# Patient Record
Sex: MALE | Race: WHITE
[De-identification: names, ages, dates, MRNs, and addresses within clinical notes are randomized per-mention and may not be internally consistent; named-entity substitution may affect disease eponyms.]

---

## 2017-02-20 ENCOUNTER — HOSPITAL ENCOUNTER (INPATIENT)
Dept: HOSPITAL 74 - FM/S | Age: 57
LOS: 2 days | Discharge: HOME HEALTH | DRG: 301 | End: 2017-02-22
Attending: ORTHOPAEDIC SURGERY | Admitting: ORTHOPAEDIC SURGERY
Payer: COMMERCIAL

## 2017-02-20 VITALS — BODY MASS INDEX: 32.8 KG/M2

## 2017-02-20 DIAGNOSIS — M16.11: Primary | ICD-10-CM

## 2017-02-20 PROCEDURE — 8E0Y0CZ ROBOTIC ASSISTED PROCEDURE OF LOWER EXTREMITY, OPEN APPROACH: ICD-10-PCS | Performed by: ORTHOPAEDIC SURGERY

## 2017-02-20 PROCEDURE — 0SR90JA REPLACEMENT OF RIGHT HIP JOINT WITH SYNTHETIC SUBSTITUTE, UNCEMENTED, OPEN APPROACH: ICD-10-PCS | Performed by: ORTHOPAEDIC SURGERY

## 2017-02-20 RX ADMIN — DOCUSATE SODIUM,SENNOSIDES SCH: 50; 8.6 TABLET, FILM COATED ORAL at 23:21

## 2017-02-20 RX ADMIN — OXYCODONE HYDROCHLORIDE ONE: 10 TABLET, FILM COATED, EXTENDED RELEASE ORAL at 23:19

## 2017-02-20 RX ADMIN — CEFAZOLIN SODIUM SCH MLS/HR: 2 SOLUTION INTRAVENOUS at 23:28

## 2017-02-20 RX ADMIN — PANTOPRAZOLE SODIUM SCH: 40 TABLET, DELAYED RELEASE ORAL at 23:21

## 2017-02-20 RX ADMIN — GABAPENTIN SCH MG: 300 CAPSULE ORAL at 21:47

## 2017-02-20 RX ADMIN — OXYCODONE HYDROCHLORIDE AND ACETAMINOPHEN SCH MG: 500 TABLET ORAL at 21:47

## 2017-02-20 RX ADMIN — OXYCODONE HYDROCHLORIDE ONE MG: 10 TABLET, FILM COATED, EXTENDED RELEASE ORAL at 06:45

## 2017-02-20 RX ADMIN — GABAPENTIN ONE: 300 CAPSULE ORAL at 23:18

## 2017-02-20 RX ADMIN — ACETAMINOPHEN ONE: 10 INJECTION, SOLUTION INTRAVENOUS at 23:20

## 2017-02-20 RX ADMIN — OXYCODONE HYDROCHLORIDE SCH MG: 10 TABLET, FILM COATED, EXTENDED RELEASE ORAL at 21:47

## 2017-02-20 RX ADMIN — MULTIVITAMIN TABLET SCH: TABLET at 23:21

## 2017-02-20 RX ADMIN — DOCUSATE SODIUM,SENNOSIDES SCH TABLET: 50; 8.6 TABLET, FILM COATED ORAL at 21:47

## 2017-02-20 RX ADMIN — ACETAMINOPHEN ONE MG: 10 INJECTION, SOLUTION INTRAVENOUS at 12:30

## 2017-02-20 RX ADMIN — CEFAZOLIN SODIUM SCH MLS/HR: 2 SOLUTION INTRAVENOUS at 15:50

## 2017-02-20 RX ADMIN — OXYCODONE HYDROCHLORIDE AND ACETAMINOPHEN SCH: 500 TABLET ORAL at 23:21

## 2017-02-20 RX ADMIN — ACETAMINOPHEN SCH MG: 325 TABLET ORAL at 20:47

## 2017-02-20 RX ADMIN — GABAPENTIN ONE MG: 300 CAPSULE ORAL at 06:45

## 2017-02-20 NOTE — OP
Operative Note





- Note:


Operative Date: 02/20/17


Pre-Operative Diagnosis: right hip OA


Operation: right JOVANA with MAKOplasty


Post-Operative Diagnosis: Same as Pre-op


Surgeon: Srini Wilburn


Assistant: Berkley Lemons


Anesthesia: Spinal


Specimens Removed: resected bone


Estimated Blood Loss (mls): 200

## 2017-02-20 NOTE — SPEC
DATE OF OPERATION:

 

PREOPERATIVE DIAGNOSIS:  Right hip osteoarthritis.  

 

POSTOPERATIVE DIAGNOSIS:  Right hip osteoarthritis.  

 

PROCEDURE:  Right total hip replacement with Makoplasty robotic navigation.  

 

ATTENDING:  Nisa Toure M.D. 

 

ASSISTANT:  CRISTINE Winslow 

 

ANESTHESIA:  Spinal plus sedation.  

ESTIMATED BLOOD LOSS:  200 mL.  

 

COMPLICATIONS:  None.  

 

SPECIMENS:  Resected bone was sent for pathology and analysis.

 

DISPOSITION:  The patient was transferred to the PACU in stable condition.  

 

IMPLANTS USED:  [place] Accolade 2 size 5 femoral component, 52-mm acetabular

component, with MDM bipolar head ball, 35 mm acetabular screw, femoral neck angle was

127 degrees.  

 

INDICATION:  This is a 57-year-old male who presents to the office complaining of

severe right hip pain.  He was treated for many years with nonoperative management

and failed.  Oral medications, injections, physical therapy.  He was subsequently

indicated for a right total hip replacement with Makoplasty robotic navigation.  The

risks, benefits, and alternatives of the procedure are explained to the patient in

great detail, and he elected to proceed with the surgery. 

 

On the day of surgery, the patient was taken to the operating room and placed on the

OR table.  Spinal anesthesia was administered by the anesthesiologist.  The patient

was then positioned in the lateral decubitus position on the table and all bony

prominences were padded.  An axillary roll was placed.  The operative hip was then

prepped and draped in the usual sterile fashion and intravenous antibiotics were

given for infection prophylaxis.  A surgical time-out was then performed with the

team, and the patients identity, procedure, side, availability of implants, and the

administration of antibiotics was confirmed.  

 

An approximately 15cm longitudinal incision was made through the skin centered on the

greater trochanter of the hip.  This dissection was carried down through the

subcutaneous tissues to the deep fascia.  This fascia was then incised and a cobra

was placed around the inferior femoral neck.  Electrocautery was used to reflect the

anterior 40% of the gluteus medius and minimus starting at the musculotendinous

junction and leaving a cuff for closure.  This was reflected to reveal the capsule of

the hip joint.  An anterior capsulectomy was performed and the femoral head and neck

was visualized.  Grade 4 changes were noted diffusely throughout the joint.

 

At this point, three small stab incisions were made superior to the main incision

along the iliac crest.  Three self-drilling Willy pins were then placed and the

Jarred pelvic array was attached.   Reference points on the limb were then entered into

the robotic device and the limb length deficiency, offset, and femoral neck resection

level were then calculated by the software.  The hip was then dislocated with

traction and external rotation, an oscillating saw was used to make the femoral neck

cut at the level previously templated, and the femoral head was removed. 

 

Attention was then turned to the acetabulum.  Retractors were then placed around the

acetabulum and the labrum was removed.  An acetabular checkpoint pin and the [employer]

software was used to register the contours of the acetabulum.  The acetabulum was

then reamed in a single stage to the preoperatively templated size using the [employer]

robotic arm.  The appropriately sized cup was then impacted and had solid fixation as

well as the preset inclination and version of 40 and 20 degrees, respectively.  A

polyethylene liner was then placed in the cup.

 

Attention was then turned back to the femur, which was externally rotated for

improved visualization. A femoral neck elevator was used to present the femoral neck

cut, a box osteotome was used to enter the femoral canal, and a canal finder was used

to go down the femoral shaft.  The Jarred broaches were used sequentially until the

optimal scratch fit was achieved.  This correlated to the preoperatively templated

size.  From here, several different offset head and neck configurations were tested

until excellent stability and length was obtained.  These measurements were

quantified using the [employer] software.

 

All trial components were then removed, the femur was copiously irrigated, and the

final components were placed.  Leg length and stability were checked again and found

to be excellent.  Irrigation was performed again.  Wound closure was started by

repairing the abductor muscles with a No. 2 Fiberwire stitch in a [place]

configuration passed through bone tunnels in the greater trochanter and tied over a

bony bridge.  This repair was then reinforced with a 0 VLoc 180 barbed suture.  Next,

No. 1 Polysorb and 0 VLoc 180 was used to close the fascia.  The deep subcutaneous

tissue was closed with No. 1 Polysorb sutures, and 2-0 Polysorb was used for the

superficial subcutaneous tissue.  The skin was closed using both 3-0 VLoc 90 suture

in a running subcuticular fashion and SwiftSet skin adhesive.   The Jarred array and

pins were removed from the iliac crest and the stab incision sites were irrigated and

closed with 4-0 Polysorb sutures and SwiftSet skin adhesive.  Once this was completed

a sterile dressing was applied.  The patient was then awakened and taken to the PACU

in stable condition.

 

 

NISA TOURE M.D.

 

[id number]

DD: 02/20/2017 11:28

DT: 02/20/2017 17:33

Job #:  [id number]

## 2017-02-20 NOTE — HP
Admitting History and Physical





- Admission


Chief Complaint: right hip osteoarthritis x years


History of Present Illness: 


57 year old male presents today in regard to his right hip. Longstanding 

osteoarthritis of his right hip. Patient complains of pain, limited mobility 

and difficulty ambulating. Patient has failed conservative treatment including 

PO medication and activity modification. At this point, patient would like to 

proceed with a right total hip arthroplasty (MAKOplasty). 








- Past Surgical History


Additional Past Surgical History: 


See written H&P








- Smoking History


Smoking history: Former smoker


Have you smoked in the past 12 months: No


If you are a former smoker, when did you quit?: 1983





- Alcohol/Substance Use


Hx Alcohol Use: No





Home Medications





- Allergies


Allergies/Adverse Reactions: 


 Allergies











Allergy/AdvReac Type Severity Reaction Status Date / Time


 


aspirin Allergy Severe Difficulty Verified 02/20/17 06:50





   Breathing  


 


Iodine and Iodide Containing Allergy Severe Difficulty Verified 02/20/17 06:50





Produc   Breathing  


 


NSAIDS (Non-Steroidal Allergy Severe Difficulty Verified 02/20/17 06:50





Anti-Inflamma   Breathing  


 


shellfish derived Allergy Severe Difficulty Verified 02/20/17 06:50





   Breathing  














- Home Medications


Home Medications: 


Ambulatory Orders





Acetaminophen [Tylenol] 650 mg PO QID PRN 02/09/17 











Review of Systems





- Review of Systems


Musculoskeletal: reports: Decreased ROM (Right hip), Joint Pain





Physical Examination


Vital Signs: 


 Vital Signs











Temperature  97.9 F   02/20/17 06:52


 


Pulse Rate  60   02/20/17 06:52


 


Respiratory Rate  18   02/20/17 06:52


 


Blood Pressure  126/87   02/20/17 06:52


 


O2 Sat by Pulse Oximetry (%)  100   02/20/17 06:56











Constitutional: Yes: Well Nourished, No Distress


Eyes: Yes: Conjunctiva Clear


HENT: Yes: Atraumatic, Normocephalic


Neck: Yes: Supple


Cardiovascular: Yes: Regular Rate and Rhythm


Respiratory: Yes: Regular


Gastrointestinal: Yes: Soft


...Rectal Exam: Yes: Deferred


Musculoskeletal: Yes: Joint Stiffness, Other (Limited ROM right hip)





Assessment/Plan


57 year old male with longstanding right hip osteoarthritis. Patient has failed 

conservative treatment. Proceed with a right total hip arthroplasty (MAKOplasty)

.

## 2017-02-21 LAB
ANION GAP SERPL CALC-SCNC: 8 MMOL/L (ref 8–16)
CALCIUM SERPL-MCNC: 8.6 MG/DL (ref 8.4–10.2)
CO2 SERPL-SCNC: 28 MMOL/L (ref 22–28)
CREAT SERPL-MCNC: 0.9 MG/DL (ref 0.6–1.3)
DEPRECATED RDW RBC AUTO: 11.6 % (ref 11.9–15.9)
GLUCOSE SERPL-MCNC: 134 MG/DL (ref 74–106)
MCH RBC QN AUTO: 30.2 PG (ref 25.7–33.7)
MCHC RBC AUTO-ENTMCNC: 33.3 G/DL (ref 32–35.9)
MCV RBC: 90.6 FL (ref 80–96)
PLATELET # BLD AUTO: 215 K/MM3 (ref 134–434)
PMV BLD: 9.5 FL (ref 7.5–11.1)
WBC # BLD AUTO: 12.2 K/MM3 (ref 4–10)

## 2017-02-21 RX ADMIN — ACETAMINOPHEN SCH MG: 325 TABLET ORAL at 20:37

## 2017-02-21 RX ADMIN — GABAPENTIN SCH MG: 300 CAPSULE ORAL at 09:25

## 2017-02-21 RX ADMIN — APIXABAN SCH MG: 2.5 TABLET, FILM COATED ORAL at 09:30

## 2017-02-21 RX ADMIN — PANTOPRAZOLE SODIUM SCH MG: 40 TABLET, DELAYED RELEASE ORAL at 09:25

## 2017-02-21 RX ADMIN — DOCUSATE SODIUM,SENNOSIDES SCH TABLET: 50; 8.6 TABLET, FILM COATED ORAL at 09:25

## 2017-02-21 RX ADMIN — OXYCODONE HYDROCHLORIDE AND ACETAMINOPHEN SCH MG: 500 TABLET ORAL at 21:24

## 2017-02-21 RX ADMIN — ACETAMINOPHEN SCH: 325 TABLET ORAL at 02:00

## 2017-02-21 RX ADMIN — APIXABAN SCH MG: 2.5 TABLET, FILM COATED ORAL at 21:25

## 2017-02-21 RX ADMIN — ACETAMINOPHEN SCH MG: 325 TABLET ORAL at 08:34

## 2017-02-21 RX ADMIN — DOCUSATE SODIUM,SENNOSIDES SCH TABLET: 50; 8.6 TABLET, FILM COATED ORAL at 21:24

## 2017-02-21 RX ADMIN — OXYCODONE HYDROCHLORIDE AND ACETAMINOPHEN SCH MG: 500 TABLET ORAL at 09:25

## 2017-02-21 RX ADMIN — GABAPENTIN SCH MG: 300 CAPSULE ORAL at 21:24

## 2017-02-21 RX ADMIN — MULTIVITAMIN TABLET SCH TAB: TABLET at 09:25

## 2017-02-21 RX ADMIN — OXYCODONE HYDROCHLORIDE SCH MG: 10 TABLET, FILM COATED, EXTENDED RELEASE ORAL at 09:26

## 2017-02-21 RX ADMIN — OXYCODONE HYDROCHLORIDE SCH MG: 10 TABLET, FILM COATED, EXTENDED RELEASE ORAL at 21:24

## 2017-02-21 RX ADMIN — ACETAMINOPHEN SCH MG: 325 TABLET ORAL at 13:57

## 2017-02-21 NOTE — PN
Progress Note (short form)





- Note


Progress Note: 


57M POD1 s/p right THR under spinal anesthetic doing well. Pt states that pain 

is well controlled, AVSS, reports no anesthetic complications. Sensory and 

motor function intact in both lower extremities.

## 2017-02-22 VITALS — TEMPERATURE: 98.6 F

## 2017-02-22 VITALS — DIASTOLIC BLOOD PRESSURE: 70 MMHG | HEART RATE: 86 BPM | SYSTOLIC BLOOD PRESSURE: 115 MMHG

## 2017-02-22 LAB
ANION GAP SERPL CALC-SCNC: 8 MMOL/L (ref 8–16)
CALCIUM SERPL-MCNC: 8.4 MG/DL (ref 8.4–10.2)
CO2 SERPL-SCNC: 31 MMOL/L (ref 22–28)
CREAT SERPL-MCNC: 0.9 MG/DL (ref 0.6–1.3)
DEPRECATED RDW RBC AUTO: 11.9 % (ref 11.9–15.9)
GLUCOSE SERPL-MCNC: 121 MG/DL (ref 74–106)
MCH RBC QN AUTO: 30.2 PG (ref 25.7–33.7)
MCHC RBC AUTO-ENTMCNC: 32.9 G/DL (ref 32–35.9)
MCV RBC: 91.7 FL (ref 80–96)
PLATELET # BLD AUTO: 194 K/MM3 (ref 134–434)
PMV BLD: 9.4 FL (ref 7.5–11.1)
WBC # BLD AUTO: 9.6 K/MM3 (ref 4–10)

## 2017-02-22 RX ADMIN — MULTIVITAMIN TABLET SCH TAB: TABLET at 09:03

## 2017-02-22 RX ADMIN — PANTOPRAZOLE SODIUM SCH MG: 40 TABLET, DELAYED RELEASE ORAL at 09:03

## 2017-02-22 RX ADMIN — ACETAMINOPHEN SCH: 325 TABLET ORAL at 01:52

## 2017-02-22 RX ADMIN — DOCUSATE SODIUM,SENNOSIDES SCH TABLET: 50; 8.6 TABLET, FILM COATED ORAL at 09:03

## 2017-02-22 RX ADMIN — OXYCODONE HYDROCHLORIDE SCH MG: 10 TABLET, FILM COATED, EXTENDED RELEASE ORAL at 09:03

## 2017-02-22 RX ADMIN — GABAPENTIN SCH MG: 300 CAPSULE ORAL at 09:03

## 2017-02-22 RX ADMIN — ACETAMINOPHEN SCH MG: 325 TABLET ORAL at 13:18

## 2017-02-22 RX ADMIN — APIXABAN SCH MG: 2.5 TABLET, FILM COATED ORAL at 09:03

## 2017-02-22 RX ADMIN — ACETAMINOPHEN SCH MG: 325 TABLET ORAL at 08:04

## 2017-02-22 RX ADMIN — OXYCODONE HYDROCHLORIDE AND ACETAMINOPHEN SCH MG: 500 TABLET ORAL at 09:03

## 2017-02-22 NOTE — PN
Progress Note (short form)





- Note


Progress Note: 


This note is for Tuesday 2/21/17.





Pt seen and examined by me Tuesday evening.  Doing well.  Walked several 

hundred feet (possibly 1000+) with PT and with family throughout the day.  Pain 

well controlled. 





AVSS





 Laboratory Tests











  02/21/17 02/21/17





  07:33 07:33


 


WBC  12.2 H 


 


Hgb  13.1 


 


Hct  39.2 


 


Plt Count  215 


 


Sodium   135 L


 


Potassium   4.3


 


Chloride   99


 


Carbon Dioxide   28


 


Anion Gap   8


 


BUN   12


 


Creatinine   0.9


 


Random Glucose   134 H


 


Calcium   8.6











Gen: NAD





RLE:  c/d/i, NVID





A/P 58yo male POD#1 s/p R JOVANA





1.  PT/OOB





2.  D/C home tomorrow

## 2017-02-22 NOTE — PATH
Surgical Pathology Report



Patient Name:  YAMILET PÉREZ

Accession #:  

Med. Rec. #:  [id number]                                                        

   /Age/Gender:  1960 (Age: 57) / M

Account:  [id number]                                                          

             Location: [facility]

Taken:  2017

Received:  2017

Reported:  2017

Physicians:  Srini Wilburn M.D.

  



Specimen(s) Received

 RIGHT FEMORAL HEAD 





Clinical History

Right hip osteoarthritis







Final Diagnosis

FEMORAL HEAD, RIGHT, TOTAL HIP REPLACEMENT:

DEGENERATIVE JOINT DISEASE.





***Electronically Signed***

Marielena Vincent M.D.





Gross Description

Received in formalin, labeled "right femoral head," is a 4.8 x 4.8 x 4.5 cm.

femoral head with a 1.2 cm in length portion of femoral neck attached. The

margin of resection is smooth. There is a 3.3 cm in greatest dimension area of

eburnation present. The remaining articular surface is tan-yellow and focally

granular. The underlying trabecular bone is yellow and hard. A representative

section is submitted in one cassette, following decalcification.

## 2017-02-22 NOTE — PN
Progress Note (short form)





- Note


Progress Note: 


This note is for Wednesday 2/22/17.





Pt seen and examined by me this morning.  Doing well.  No complaints.





AVSS





 Selected Entries











  02/22/17





  05:23


 


Temperature 98.6 F


 


Pulse Rate 81


 


Respiratory 20





Rate 


 


Blood Pressure 125/84


 


O2 Sat by Pulse 99





Oximetry (%) 


 


Oxygen Delivery Room Air





Method 








 Laboratory Tests











  02/22/17 02/22/17





  07:00 07:00


 


WBC  9.6 


 


Hgb  12.8 


 


Hct  38.8 


 


Plt Count  194 


 


Sodium   Pending


 


Potassium   Pending











Gen: NAD





RLE:  c/d/i, NVID





A/P 56yo male POD#2 s/p R JOVANA





1.  PT/OOB





2.  D/C home today

## 2022-12-20 ENCOUNTER — OFFICE (OUTPATIENT)
Dept: URBAN - METROPOLITAN AREA CLINIC 86 | Facility: CLINIC | Age: 62
Setting detail: OPHTHALMOLOGY
End: 2022-12-20
Payer: MEDICARE

## 2022-12-20 DIAGNOSIS — H40.003: ICD-10-CM

## 2022-12-20 DIAGNOSIS — H33.311: ICD-10-CM

## 2022-12-20 DIAGNOSIS — H40.033: ICD-10-CM

## 2022-12-20 DIAGNOSIS — H25.13: ICD-10-CM

## 2022-12-20 DIAGNOSIS — E11.9: ICD-10-CM

## 2022-12-20 DIAGNOSIS — H11.042: ICD-10-CM

## 2022-12-20 PROCEDURE — 92250 FUNDUS PHOTOGRAPHY W/I&R: CPT | Performed by: OPHTHALMOLOGY

## 2022-12-20 PROCEDURE — 92020 GONIOSCOPY: CPT | Performed by: OPHTHALMOLOGY

## 2022-12-20 PROCEDURE — 92201 OPSCPY EXTND RTA DRAW UNI/BI: CPT | Performed by: OPHTHALMOLOGY

## 2022-12-20 PROCEDURE — 92014 COMPRE OPH EXAM EST PT 1/>: CPT | Performed by: OPHTHALMOLOGY

## 2022-12-20 ASSESSMENT — VISUAL ACUITY
OS_BCVA: 20/30-2
OD_BCVA: 20/25-1

## 2022-12-20 ASSESSMENT — CONFRONTATIONAL VISUAL FIELD TEST (CVF)
OD_FINDINGS: FULL
OS_FINDINGS: FULL

## 2022-12-20 ASSESSMENT — CORNEAL PTERYGIUM: OS_PTERYGIUM: NASAL 2MM

## 2022-12-20 ASSESSMENT — TONOMETRY
OS_IOP_MMHG: 18
OD_IOP_MMHG: 20

## 2022-12-28 ENCOUNTER — OFFICE (OUTPATIENT)
Dept: URBAN - METROPOLITAN AREA CLINIC 29 | Facility: CLINIC | Age: 62
Setting detail: OPHTHALMOLOGY
End: 2022-12-28
Payer: MEDICARE

## 2022-12-28 DIAGNOSIS — D31.31: ICD-10-CM

## 2022-12-28 DIAGNOSIS — H25.13: ICD-10-CM

## 2022-12-28 DIAGNOSIS — H40.003: ICD-10-CM

## 2022-12-28 DIAGNOSIS — H40.033: ICD-10-CM

## 2022-12-28 DIAGNOSIS — E11.9: ICD-10-CM

## 2022-12-28 DIAGNOSIS — H11.042: ICD-10-CM

## 2022-12-28 DIAGNOSIS — H43.811: ICD-10-CM

## 2022-12-28 PROCEDURE — 99213 OFFICE O/P EST LOW 20 MIN: CPT | Performed by: OPHTHALMOLOGY

## 2022-12-28 PROCEDURE — 92134 CPTRZ OPH DX IMG PST SGM RTA: CPT | Performed by: OPHTHALMOLOGY

## 2022-12-28 ASSESSMENT — CORNEAL PTERYGIUM: OS_PTERYGIUM: NASAL 2MM

## 2022-12-28 ASSESSMENT — CONFRONTATIONAL VISUAL FIELD TEST (CVF)
OD_FINDINGS: FULL
OS_FINDINGS: FULL

## 2022-12-28 ASSESSMENT — VISUAL ACUITY
OD_BCVA: 20/30-2
OS_BCVA: 20/30-2

## 2022-12-28 ASSESSMENT — TONOMETRY
OD_IOP_MMHG: 19
OS_IOP_MMHG: 18

## 2023-01-20 ENCOUNTER — OFFICE (OUTPATIENT)
Dept: URBAN - METROPOLITAN AREA CLINIC 29 | Facility: CLINIC | Age: 63
Setting detail: OPHTHALMOLOGY
End: 2023-01-20
Payer: MEDICARE

## 2023-01-20 DIAGNOSIS — H40.003: ICD-10-CM

## 2023-01-20 DIAGNOSIS — E11.9: ICD-10-CM

## 2023-01-20 DIAGNOSIS — H25.13: ICD-10-CM

## 2023-01-20 DIAGNOSIS — D31.31: ICD-10-CM

## 2023-01-20 DIAGNOSIS — H43.811: ICD-10-CM

## 2023-01-20 DIAGNOSIS — H11.042: ICD-10-CM

## 2023-01-20 DIAGNOSIS — H40.033: ICD-10-CM

## 2023-01-20 PROCEDURE — 99213 OFFICE O/P EST LOW 20 MIN: CPT | Performed by: OPHTHALMOLOGY

## 2023-01-20 PROCEDURE — 92250 FUNDUS PHOTOGRAPHY W/I&R: CPT | Performed by: OPHTHALMOLOGY

## 2023-01-20 ASSESSMENT — TONOMETRY: OD_IOP_MMHG: 18

## 2023-01-20 ASSESSMENT — VISUAL ACUITY
OD_BCVA: 20/30-2
OS_BCVA: 20/50-

## 2023-01-20 ASSESSMENT — CORNEAL PTERYGIUM: OS_PTERYGIUM: NASAL 2MM

## 2023-01-20 ASSESSMENT — CONFRONTATIONAL VISUAL FIELD TEST (CVF)
OS_FINDINGS: FULL
OD_FINDINGS: FULL

## 2023-04-12 ENCOUNTER — OFFICE (OUTPATIENT)
Dept: URBAN - METROPOLITAN AREA CLINIC 86 | Facility: CLINIC | Age: 63
Setting detail: OPHTHALMOLOGY
End: 2023-04-12
Payer: MEDICARE

## 2023-04-12 DIAGNOSIS — H33.311: ICD-10-CM

## 2023-04-12 PROCEDURE — 92201 OPSCPY EXTND RTA DRAW UNI/BI: CPT | Performed by: OPHTHALMOLOGY

## 2023-04-12 PROCEDURE — 92014 COMPRE OPH EXAM EST PT 1/>: CPT | Performed by: OPHTHALMOLOGY

## 2023-04-12 ASSESSMENT — CORNEAL PTERYGIUM: OS_PTERYGIUM: NASAL 2MM

## 2023-04-12 ASSESSMENT — VISUAL ACUITY
OS_BCVA: 20/50-
OD_BCVA: 20/30-2

## 2023-04-12 ASSESSMENT — CONFRONTATIONAL VISUAL FIELD TEST (CVF)
OD_FINDINGS: FULL
OS_FINDINGS: FULL

## 2023-04-12 ASSESSMENT — TONOMETRY
OS_IOP_MMHG: 14
OD_IOP_MMHG: 12

## 2023-04-13 ENCOUNTER — OFFICE (OUTPATIENT)
Dept: URBAN - METROPOLITAN AREA CLINIC 121 | Facility: CLINIC | Age: 63
Setting detail: OPHTHALMOLOGY
End: 2023-04-13
Payer: MEDICARE

## 2023-04-13 DIAGNOSIS — H33.301: ICD-10-CM

## 2023-04-13 DIAGNOSIS — H43.811: ICD-10-CM

## 2023-04-13 DIAGNOSIS — H33.311: ICD-10-CM

## 2023-04-13 PROCEDURE — 67145 PROPH RTA DTCHMNT PC: CPT | Performed by: OPHTHALMOLOGY

## 2023-04-13 PROCEDURE — 99213 OFFICE O/P EST LOW 20 MIN: CPT | Performed by: OPHTHALMOLOGY

## 2023-04-13 ASSESSMENT — CORNEAL PTERYGIUM: OS_PTERYGIUM: NASAL 2MM

## 2023-04-13 ASSESSMENT — CONFRONTATIONAL VISUAL FIELD TEST (CVF)
OD_FINDINGS: FULL
OS_FINDINGS: FULL

## 2023-04-13 ASSESSMENT — VISUAL ACUITY
OS_BCVA: 20/50
OD_BCVA: 20/30

## 2023-04-19 ENCOUNTER — OFFICE (OUTPATIENT)
Dept: URBAN - METROPOLITAN AREA CLINIC 29 | Facility: CLINIC | Age: 63
Setting detail: OPHTHALMOLOGY
End: 2023-04-19
Payer: MEDICARE

## 2023-04-19 DIAGNOSIS — H33.311: ICD-10-CM

## 2023-04-19 DIAGNOSIS — H43.811: ICD-10-CM

## 2023-04-19 PROCEDURE — 92250 FUNDUS PHOTOGRAPHY W/I&R: CPT | Performed by: OPHTHALMOLOGY

## 2023-04-19 PROCEDURE — 99024 POSTOP FOLLOW-UP VISIT: CPT | Performed by: OPHTHALMOLOGY

## 2023-04-19 ASSESSMENT — CORNEAL PTERYGIUM: OS_PTERYGIUM: NASAL 2MM

## 2023-04-19 ASSESSMENT — VISUAL ACUITY
OD_BCVA: 20/30
OS_BCVA: 20/40-2

## 2023-04-19 ASSESSMENT — TONOMETRY: OD_IOP_MMHG: 14

## 2023-04-19 ASSESSMENT — CONFRONTATIONAL VISUAL FIELD TEST (CVF)
OD_FINDINGS: FULL
OS_FINDINGS: FULL

## 2023-05-05 ENCOUNTER — OFFICE (OUTPATIENT)
Dept: URBAN - METROPOLITAN AREA CLINIC 29 | Facility: CLINIC | Age: 63
Setting detail: OPHTHALMOLOGY
End: 2023-05-05
Payer: MEDICARE

## 2023-05-05 DIAGNOSIS — H33.311: ICD-10-CM

## 2023-05-05 PROBLEM — H43.391 VITREOUS FLOATERS; RIGHT EYE: Status: ACTIVE | Noted: 2023-04-12

## 2023-05-05 PROCEDURE — 67145 PROPH RTA DTCHMNT PC: CPT | Performed by: OPHTHALMOLOGY

## 2023-05-05 ASSESSMENT — CONFRONTATIONAL VISUAL FIELD TEST (CVF)
OD_FINDINGS: FULL
OS_FINDINGS: FULL

## 2023-05-05 ASSESSMENT — VISUAL ACUITY
OD_BCVA: 20/25
OS_BCVA: 20/40+1

## 2023-05-05 ASSESSMENT — CORNEAL PTERYGIUM: OS_PTERYGIUM: NASAL 2MM

## 2023-05-24 ENCOUNTER — OFFICE (OUTPATIENT)
Dept: URBAN - METROPOLITAN AREA CLINIC 29 | Facility: CLINIC | Age: 63
Setting detail: OPHTHALMOLOGY
End: 2023-05-24
Payer: MEDICARE

## 2023-05-24 DIAGNOSIS — H33.311: ICD-10-CM

## 2023-05-24 DIAGNOSIS — H43.811: ICD-10-CM

## 2023-05-24 PROCEDURE — 99213 OFFICE O/P EST LOW 20 MIN: CPT | Performed by: OPHTHALMOLOGY

## 2023-05-24 ASSESSMENT — REFRACTION_MANIFEST
OS_AXIS: 165
OD_SPHERE: -1.00
OD_CYLINDER: +0.75
OD_AXIS: 015
OD_VA1: 20/20-1
OS_VA1: 20/20
OS_SPHERE: -0.75
OS_CYLINDER: +0.75

## 2023-05-24 ASSESSMENT — CONFRONTATIONAL VISUAL FIELD TEST (CVF)
OS_FINDINGS: FULL
OD_FINDINGS: FULL

## 2023-05-24 ASSESSMENT — CORNEAL PTERYGIUM: OS_PTERYGIUM: NASAL 2MM

## 2023-05-24 ASSESSMENT — TONOMETRY
OS_IOP_MMHG: 20
OD_IOP_MMHG: 17

## 2023-05-24 ASSESSMENT — SPHEQUIV_DERIVED
OS_SPHEQUIV: -0.375
OD_SPHEQUIV: -0.625

## 2023-05-24 ASSESSMENT — VISUAL ACUITY
OS_BCVA: 20/50-
OD_BCVA: 20/25

## 2023-08-23 ENCOUNTER — OFFICE (OUTPATIENT)
Dept: URBAN - METROPOLITAN AREA CLINIC 29 | Facility: CLINIC | Age: 63
Setting detail: OPHTHALMOLOGY
End: 2023-08-23
Payer: MEDICARE

## 2023-08-23 DIAGNOSIS — H33.311: ICD-10-CM

## 2023-08-23 DIAGNOSIS — D31.31: ICD-10-CM

## 2023-08-23 DIAGNOSIS — H43.811: ICD-10-CM

## 2023-08-23 PROCEDURE — 99213 OFFICE O/P EST LOW 20 MIN: CPT | Performed by: OPHTHALMOLOGY

## 2023-08-23 PROCEDURE — 92250 FUNDUS PHOTOGRAPHY W/I&R: CPT | Performed by: OPHTHALMOLOGY

## 2023-08-23 ASSESSMENT — REFRACTION_MANIFEST
OS_CYLINDER: +0.75
OS_SPHERE: -0.75
OD_SPHERE: -1.00
OS_VA1: 20/20
OD_VA1: 20/20-1
OS_AXIS: 165
OD_CYLINDER: +0.75
OD_AXIS: 015

## 2023-08-23 ASSESSMENT — SPHEQUIV_DERIVED
OS_SPHEQUIV: -0.375
OD_SPHEQUIV: -0.625

## 2023-08-23 ASSESSMENT — CORNEAL PTERYGIUM: OS_PTERYGIUM: NASAL 2MM

## 2023-08-23 ASSESSMENT — CONFRONTATIONAL VISUAL FIELD TEST (CVF)
OD_FINDINGS: FULL
OS_FINDINGS: FULL

## 2023-08-23 ASSESSMENT — VISUAL ACUITY
OD_BCVA: 20/25
OS_BCVA: 20/40

## 2023-08-23 ASSESSMENT — TONOMETRY
OS_IOP_MMHG: 11
OD_IOP_MMHG: 10

## 2024-02-21 ENCOUNTER — OFFICE (OUTPATIENT)
Dept: URBAN - METROPOLITAN AREA CLINIC 29 | Facility: CLINIC | Age: 64
Setting detail: OPHTHALMOLOGY
End: 2024-02-21
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
## 2024-02-21 PROCEDURE — NO SHOW FE NO SHOW FEE: Performed by: OPHTHALMOLOGY

## 2024-08-09 ENCOUNTER — OFFICE (OUTPATIENT)
Dept: URBAN - METROPOLITAN AREA CLINIC 29 | Facility: CLINIC | Age: 64
Setting detail: OPHTHALMOLOGY
End: 2024-08-09
Payer: MEDICARE

## 2024-08-09 DIAGNOSIS — H43.811: ICD-10-CM

## 2024-08-09 DIAGNOSIS — D31.31: ICD-10-CM

## 2024-08-09 DIAGNOSIS — H33.311: ICD-10-CM

## 2024-08-09 DIAGNOSIS — H43.391: ICD-10-CM

## 2024-08-09 PROCEDURE — 92014 COMPRE OPH EXAM EST PT 1/>: CPT | Performed by: OPHTHALMOLOGY

## 2024-08-09 ASSESSMENT — CONFRONTATIONAL VISUAL FIELD TEST (CVF)
OS_FINDINGS: FULL
OD_FINDINGS: FULL

## 2025-04-16 ENCOUNTER — OFFICE (OUTPATIENT)
Dept: URBAN - METROPOLITAN AREA CLINIC 29 | Facility: CLINIC | Age: 65
Setting detail: OPHTHALMOLOGY
End: 2025-04-16
Payer: MEDICARE

## 2025-04-16 DIAGNOSIS — H40.033: ICD-10-CM

## 2025-04-16 DIAGNOSIS — D31.31: ICD-10-CM

## 2025-04-16 DIAGNOSIS — H40.003: ICD-10-CM

## 2025-04-16 DIAGNOSIS — H25.13: ICD-10-CM

## 2025-04-16 DIAGNOSIS — H43.391: ICD-10-CM

## 2025-04-16 DIAGNOSIS — H11.042: ICD-10-CM

## 2025-04-16 DIAGNOSIS — H33.311: ICD-10-CM

## 2025-04-16 DIAGNOSIS — E11.9: ICD-10-CM

## 2025-04-16 DIAGNOSIS — H43.813: ICD-10-CM

## 2025-04-16 PROCEDURE — 92014 COMPRE OPH EXAM EST PT 1/>: CPT | Performed by: OPHTHALMOLOGY

## 2025-04-16 ASSESSMENT — REFRACTION_CURRENTRX
OD_VPRISM_DIRECTION: SV
OD_SPHERE: +2.00
OS_SPHERE: +2.00
OS_VPRISM_DIRECTION: SV
OD_OVR_VA: 20/
OS_OVR_VA: 20/

## 2025-04-16 ASSESSMENT — REFRACTION_MANIFEST
OD_SPHERE: PLANO
OS_CYLINDER: +0.75
OS_AXIS: 165
OD_VA1: 20/25
OS_AXIS: 165
OD_AXIS: 20
OD_AXIS: 015
OS_SPHERE: -0.75
OD_CYLINDER: +1.25
OD_CYLINDER: +0.75
OS_VA1: 20/20
OS_CYLINDER: +0.75
OD_SPHERE: -1.00
OS_SPHERE: -0.25
OD_VA1: 20/20-1
OS_VA1: 20/20-2

## 2025-04-16 ASSESSMENT — TONOMETRY
OD_IOP_MMHG: 14
OS_IOP_MMHG: 14

## 2025-04-16 ASSESSMENT — REFRACTION_AUTOREFRACTION
OD_AXIS: 025
OS_CYLINDER: +0.75
OD_SPHERE: -0.50
OS_SPHERE: PLANO
OD_CYLINDER: +2.00
OS_AXIS: 150

## 2025-04-16 ASSESSMENT — CONFRONTATIONAL VISUAL FIELD TEST (CVF)
OD_FINDINGS: FULL
OS_FINDINGS: FULL

## 2025-04-16 ASSESSMENT — CORNEAL PTERYGIUM: OS_PTERYGIUM: NASAL 2MM

## 2025-04-16 ASSESSMENT — VISUAL ACUITY
OS_BCVA: 20/60-1
OD_BCVA: 20/25-1

## 2025-07-16 ENCOUNTER — OFFICE (OUTPATIENT)
Dept: URBAN - METROPOLITAN AREA CLINIC 29 | Facility: CLINIC | Age: 65
Setting detail: OPHTHALMOLOGY
End: 2025-07-16
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
## 2025-07-16 PROCEDURE — NO SHOW FE NO SHOW FEE

## 2025-07-17 ENCOUNTER — OFFICE (OUTPATIENT)
Dept: URBAN - METROPOLITAN AREA CLINIC 29 | Facility: CLINIC | Age: 65
Setting detail: OPHTHALMOLOGY
End: 2025-07-17
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
## 2025-07-17 PROCEDURE — NO SHOW FE NO SHOW FEE: Performed by: OPHTHALMOLOGY
